# Patient Record
Sex: MALE | Race: WHITE | Employment: FULL TIME | URBAN - METROPOLITAN AREA
[De-identification: names, ages, dates, MRNs, and addresses within clinical notes are randomized per-mention and may not be internally consistent; named-entity substitution may affect disease eponyms.]

---

## 2023-01-22 ENCOUNTER — OFFICE VISIT (OUTPATIENT)
Dept: URGENT CARE | Facility: CLINIC | Age: 33
End: 2023-01-22
Payer: COMMERCIAL

## 2023-01-22 VITALS
DIASTOLIC BLOOD PRESSURE: 76 MMHG | HEIGHT: 74 IN | RESPIRATION RATE: 17 BRPM | WEIGHT: 284 LBS | OXYGEN SATURATION: 97 % | HEART RATE: 69 BPM | TEMPERATURE: 98 F | SYSTOLIC BLOOD PRESSURE: 134 MMHG | BODY MASS INDEX: 36.45 KG/M2

## 2023-01-22 DIAGNOSIS — S93.401A INVERSION SPRAIN OF RIGHT ANKLE, INITIAL ENCOUNTER: ICD-10-CM

## 2023-01-22 DIAGNOSIS — M25.471 PAIN AND SWELLING OF RIGHT ANKLE: Primary | ICD-10-CM

## 2023-01-22 DIAGNOSIS — M25.571 PAIN AND SWELLING OF RIGHT ANKLE: Primary | ICD-10-CM

## 2023-01-22 PROCEDURE — 3078F PR MOST RECENT DIASTOLIC BLOOD PRESSURE < 80 MM HG: ICD-10-PCS | Mod: CPTII,S$GLB,, | Performed by: PHYSICIAN ASSISTANT

## 2023-01-22 PROCEDURE — 3078F DIAST BP <80 MM HG: CPT | Mod: CPTII,S$GLB,, | Performed by: PHYSICIAN ASSISTANT

## 2023-01-22 PROCEDURE — 3075F PR MOST RECENT SYSTOLIC BLOOD PRESS GE 130-139MM HG: ICD-10-PCS | Mod: CPTII,S$GLB,, | Performed by: PHYSICIAN ASSISTANT

## 2023-01-22 PROCEDURE — 1159F PR MEDICATION LIST DOCUMENTED IN MEDICAL RECORD: ICD-10-PCS | Mod: CPTII,S$GLB,, | Performed by: PHYSICIAN ASSISTANT

## 2023-01-22 PROCEDURE — 3075F SYST BP GE 130 - 139MM HG: CPT | Mod: CPTII,S$GLB,, | Performed by: PHYSICIAN ASSISTANT

## 2023-01-22 PROCEDURE — 3008F BODY MASS INDEX DOCD: CPT | Mod: CPTII,S$GLB,, | Performed by: PHYSICIAN ASSISTANT

## 2023-01-22 PROCEDURE — 99204 OFFICE O/P NEW MOD 45 MIN: CPT | Mod: S$GLB,,, | Performed by: PHYSICIAN ASSISTANT

## 2023-01-22 PROCEDURE — 1159F MED LIST DOCD IN RCRD: CPT | Mod: CPTII,S$GLB,, | Performed by: PHYSICIAN ASSISTANT

## 2023-01-22 PROCEDURE — 3008F PR BODY MASS INDEX (BMI) DOCUMENTED: ICD-10-PCS | Mod: CPTII,S$GLB,, | Performed by: PHYSICIAN ASSISTANT

## 2023-01-22 PROCEDURE — 99204 PR OFFICE/OUTPT VISIT, NEW, LEVL IV, 45-59 MIN: ICD-10-PCS | Mod: S$GLB,,, | Performed by: PHYSICIAN ASSISTANT

## 2023-01-22 RX ORDER — IBUPROFEN 800 MG/1
800 TABLET ORAL DAILY PRN
COMMUNITY
Start: 2022-09-16 | End: 2023-01-22 | Stop reason: SDUPTHER

## 2023-01-22 RX ORDER — IBUPROFEN 800 MG/1
800 TABLET ORAL DAILY PRN
Qty: 10 TABLET | Refills: 0 | Status: SHIPPED | OUTPATIENT
Start: 2023-01-22 | End: 2023-02-01

## 2023-01-22 NOTE — PATIENT INSTRUCTIONS
Please obtain outpatient x-ray as we discussed     R.I.C.E:    Rest, apply ice intermittently, compress with ace wrap, and elevate above heart level as much as possible.  Do not apply ice directly to skin. Wrap ice in cloth before applying.    Please make sure that you wearing appropriate supportive shoes for lower extremity issues.    OTC Tylenol (acetaminophen) up to 4,000 mg a day is safe for short periods and can be used for pain, and fever. However in high doses and prolonged use it can cause liver irritation.    OTC Ibuprofen (NSAID) is a non-steroidal anti-inflammatory that can be used for pain. However it can also cause stomach irritation if over used.    Of note: Aleve, Motrin, Advil, Mobic, meloxicam, and indomethacin are also other names of NSAIDs.    OTC Voltaren topical cream may be applied for relief, as long as you do not have any allergy to the ingredients.    You will need to follow-up with orthopedics if your symptoms persist, as we discussed.      - You must understand that you have received an Urgent Care treatment only and that you may be released before all of your medical problems are known or treated.   - You, the patient, will arrange for follow up care as instructed with your primary care provider or recommended specialist.   - If your condition worsens or fails to improve we recommend that you receive another evaluation at the ER immediately or contact your PCP to discuss your concerns, or return here.   - Please do not drive or make any important decisions for 24 hours if you have received any pain medications, sedatives or mood altering drugs during your visit.    Disclaimer: This document was drafted with the use of a voice recognition device and is likely to have sound alike errors.

## 2023-01-22 NOTE — PROGRESS NOTES
"Subjective:       Patient ID: Regis Tyson is a 32 y.o. male.    Vitals:  height is 6' 2" (1.88 m) and weight is 128.8 kg (284 lb). His tympanic temperature is 97.9 °F (36.6 °C). His blood pressure is 134/76 and his pulse is 69. His respiration is 17 and oxygen saturation is 97%.     Chief Complaint: Ankle Pain    Patient presents with pain in the right ankle after [place] injury [place] [employer] [place] yesterday afternoon.  ibuprofen and gabapentin. Pain is greatest in the lateral malleolus and posterior ankle.    Foot Injury   The incident occurred 12 to 24 hours ago. The incident occurred at the park. The injury mechanism was an inversion injury. The pain is present in the right foot. The pain is at a severity of 6/10. The pain has been Constant since onset. Associated symptoms include a loss of motion. Pertinent negatives include no inability to bear weight, loss of sensation, muscle weakness, numbness or tingling. He reports no foreign bodies present. The symptoms are aggravated by palpation and weight bearing. He has tried NSAIDs, ice, rest and elevation for the symptoms.     Musculoskeletal:  Positive for pain, trauma, joint pain and joint swelling. Negative for abnormal ROM of joint and pain with walking.   Skin:  Negative for abrasion, laceration and bruising.   Neurological:  Negative for numbness.     Objective:      Vitals:    01/22/23 1014   BP: 134/76   Pulse: 69   Resp: 17   Temp: 97.9 °F (36.6 °C)       Physical Exam   Constitutional: He is oriented to person, place, and time.  Non-toxic appearance. He does not appear ill. No distress.   HENT:   Head: Normocephalic and atraumatic.   Ears:   Right Ear: External ear normal.   Left Ear: External ear normal.   Nose: Nose normal.   Eyes: Conjunctivae are normal. No scleral icterus.   Neck: Neck supple.   Cardiovascular: Normal rate and normal pulses.   Pulmonary/Chest: Effort normal.   Musculoskeletal:      Right ankle: He exhibits swelling. He " exhibits normal range of motion and normal pulse. Tenderness. Achilles tendon normal. Achilles tendon exhibits no defect and normal Valdez's test results.      Left ankle: He exhibits normal range of motion and no swelling. No tenderness. Achilles tendon normal. Achilles tendon exhibits no defect and normal Valdez's test results.      Right foot: Normal. No swelling.      Left foot: Normal. No swelling.   Neurological: no focal deficit. He is alert, oriented to person, place, and time and at baseline. He has normal motor skills and normal sensation. He displays no weakness. Gait and coordination normal. GCS eye subscore is 4. GCS verbal subscore is 5. GCS motor subscore is 6.   Skin: Skin is warm, dry and not diaphoretic. Capillary refill takes less than 2 seconds.   Psychiatric: His behavior is normal. Mood, judgment and thought content normal.   Nursing note and vitals reviewed.      Assessment:       1. Pain and swelling of right ankle    2. Inversion sprain of right ankle, initial encounter          Plan:         Pain and swelling of right ankle  -     XR ANKLE COMPLETE 3 VIEW RIGHT; Future; Expected date: 01/22/2023  -     BANDAGE ELASTIC 3IN ACE    Inversion sprain of right ankle, initial encounter           Medical Decision Making:   Clinical Tests:   Radiological Study: Ordered  Urgent Care Management:  Ace wrap and ice pack given in clinic today    X-ray ordered and pending as we do not have x-ray capabilities at [facility] today; patient reports that he will go to [facility] tomorrow for xray. We will call with results           Patient Instructions   Please obtain outpatient x-ray as we discussed     R.I.C.E:    Rest, apply ice intermittently, compress with ace wrap, and elevate above heart level as much as possible.  Do not apply ice directly to skin. Wrap ice in cloth before applying.    Please make sure that you wearing appropriate supportive shoes for lower extremity  issues.    OTC Tylenol (acetaminophen) up to 4,000 mg a day is safe for short periods and can be used for pain, and fever. However in high doses and prolonged use it can cause liver irritation.    OTC Ibuprofen (NSAID) is a non-steroidal anti-inflammatory that can be used for pain. However it can also cause stomach irritation if over used.    Of note: Aleve, Motrin, Advil, Mobic, meloxicam, and indomethacin are also other names of NSAIDs.    OTC Voltaren topical cream may be applied for relief, as long as you do not have any allergy to the ingredients.    You will need to follow-up with orthopedics if your symptoms persist, as we discussed.      - You must understand that you have received an Urgent Care treatment only and that you may be released before all of your medical problems are known or treated.   - You, the patient, will arrange for follow up care as instructed with your primary care provider or recommended specialist.   - If your condition worsens or fails to improve we recommend that you receive another evaluation at the ER immediately or contact your PCP to discuss your concerns, or return here.   - Please do not drive or make any important decisions for 24 hours if you have received any pain medications, sedatives or mood altering drugs during your visit.    Disclaimer: This document was drafted with the use of a voice recognition device and is likely to have sound alike errors.

## 2023-01-23 ENCOUNTER — HOSPITAL ENCOUNTER (OUTPATIENT)
Dept: RADIOLOGY | Facility: HOSPITAL | Age: 33
Discharge: HOME | End: 2023-01-23
Attending: PHYSICIAN ASSISTANT
Payer: COMMERCIAL

## 2023-01-23 DIAGNOSIS — M25.471 PAIN AND SWELLING OF RIGHT ANKLE: ICD-10-CM

## 2023-01-23 DIAGNOSIS — M25.571 PAIN AND SWELLING OF RIGHT ANKLE: ICD-10-CM

## 2023-01-23 PROCEDURE — 73610 X-RAY EXAM OF ANKLE: CPT | Mod: TC,FY,PO,RT

## 2023-01-23 PROCEDURE — 73610 XR ANKLE COMPLETE 3 VIEW RIGHT: ICD-10-PCS | Mod: 26,RT,, | Performed by: RADIOLOGY

## 2023-01-23 PROCEDURE — 73610 X-RAY EXAM OF ANKLE: CPT | Mod: 26,RT,, | Performed by: RADIOLOGY

## 2023-01-24 ENCOUNTER — TELEPHONE (OUTPATIENT)
Dept: URGENT CARE | Facility: CLINIC | Age: 33
End: 2023-01-24
Payer: COMMERCIAL

## 2023-01-24 NOTE — TELEPHONE ENCOUNTER
R.I.C.E:    Rest, apply ice intermittently, compress with ace wrap, and elevate above heart level as much as possible.  Do not apply ice directly to skin. Wrap ice in cloth before applying.    Please make sure that you wearing appropriate supportive shoes for lower extremity issues.    OTC Tylenol (acetaminophen) up to 4,000 mg a day is safe for short periods and can be used for pain, and fever. However in high doses and prolonged use it can cause liver irritation.    OTC Ibuprofen (NSAID) is a non-steroidal anti-inflammatory that can be used for pain. However it can also cause stomach irritation if over used.    Of note: Aleve, Motrin, Advil, Mobic, meloxicam, and indomethacin are also other names of NSAIDs.    OTC Voltaren topical cream may be applied for relief, as long as you do not have any allergy to the ingredients.    You will need to follow-up with orthopedics if your symptoms persist, as we discussed.      XR ANKLE COMPLETE 3 VIEW RIGHT    Result Date: 1/23/2023  EXAMINATION: XR ANKLE COMPLETE 3 VIEW RIGHT CLINICAL HISTORY: pain/swelling/inversion injury; Pain in right ankle and joints of right foot TECHNIQUE: AP, lateral, and oblique images of the right ankle were performed. COMPARISON: None FINDINGS: There is soft tissue swelling about the lateral malleolus.  No acute displaced fracture or dislocation.  Enthesophytes arise from the neck of the talus.  The tibiotalar joint is well preserved.  No radiopaque foreign body.     1. Soft tissue swelling about the lateral malleolus.  No acute displaced fracture or dislocation.  Cortical irregularity along the talus secondary to degenerative enthesophytes.     Electronically signed by: Bairon Ham MD Date:    01/23/2023 Time:    08:42

## 2025-01-31 ENCOUNTER — OFFICE VISIT (OUTPATIENT)
Dept: URGENT CARE | Facility: CLINIC | Age: 35
End: 2025-01-31
Payer: COMMERCIAL

## 2025-01-31 VITALS
TEMPERATURE: 98 F | WEIGHT: 295 LBS | BODY MASS INDEX: 37.86 KG/M2 | HEIGHT: 74 IN | RESPIRATION RATE: 20 BRPM | DIASTOLIC BLOOD PRESSURE: 81 MMHG | SYSTOLIC BLOOD PRESSURE: 137 MMHG | HEART RATE: 78 BPM | OXYGEN SATURATION: 96 %

## 2025-01-31 DIAGNOSIS — H18.821 CORNEAL ABRASION DUE TO CONTACT LENS, RIGHT: ICD-10-CM

## 2025-01-31 DIAGNOSIS — H57.89 REDNESS OF EYE, RIGHT: ICD-10-CM

## 2025-01-31 DIAGNOSIS — T15.91XA FOREIGN BODY OF RIGHT EYE, INITIAL ENCOUNTER: Primary | ICD-10-CM

## 2025-01-31 DIAGNOSIS — Z97.3 WEARS CONTACT LENSES: ICD-10-CM

## 2025-01-31 PROCEDURE — 99214 OFFICE O/P EST MOD 30 MIN: CPT | Mod: S$GLB,,, | Performed by: PHYSICIAN ASSISTANT

## 2025-01-31 RX ORDER — ERYTHROMYCIN 5 MG/G
OINTMENT OPHTHALMIC 3 TIMES DAILY
Qty: 3.5 G | Refills: 0 | Status: SHIPPED | OUTPATIENT
Start: 2025-01-31 | End: 2025-02-07

## 2025-01-31 RX ORDER — PROPARACAINE HYDROCHLORIDE 5 MG/ML
1 SOLUTION/ DROPS OPHTHALMIC ONCE
Status: COMPLETED | OUTPATIENT
Start: 2025-01-31 | End: 2025-01-31

## 2025-01-31 RX ORDER — FLUORESCEIN SODIUM AND BENOXINATE HYDROCHLORIDE 2.6; 4.4 MG/ML; MG/ML
1 SOLUTION/ DROPS OPHTHALMIC ONCE
Status: COMPLETED | OUTPATIENT
Start: 2025-01-31 | End: 2025-01-31

## 2025-01-31 RX ADMIN — PROPARACAINE HYDROCHLORIDE 1 DROP: 5 SOLUTION/ DROPS OPHTHALMIC at 10:01

## 2025-01-31 RX ADMIN — FLUORESCEIN SODIUM AND BENOXINATE HYDROCHLORIDE 1 DROP: 2.6; 4.4 SOLUTION/ DROPS OPHTHALMIC at 10:01

## 2025-01-31 NOTE — LETTER
January 31, 2025      [facility]  [address], [address]  [place] [address]  Phone: [phone]       Patient: Regis Tyson   YOB: 1990  Date of Visit: 01/31/2025    To Whom It May Concern:    Bebo Tyson  was at [facility] on 01/31/2025. The patient may return to work/school on 2/1 with no restrictions. If you have any questions or concerns, or if I can be of further assistance, please do not hesitate to contact me.    Sincerely,        Lizbeth Bruno PA-C

## 2025-01-31 NOTE — PROGRESS NOTES
"Subjective:      Patient ID: Regis Tyson is a 34 y.o. male.    Vitals:  height is 6' 2" (1.88 m) and weight is 133.8 kg (295 lb). His oral temperature is 98 °F (36.7 °C). His blood pressure is 137/81 and his pulse is 78. His respiration is 20 and oxygen saturation is 96%.     Chief Complaint: Conjunctivitis    34 year old c/o R eye redness since yesterday. Pt tried OTC eyes drops that helped a little.  Reports associated photophobia.  Believes symptoms could be related to dry eye contacts. 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     Conjunctivitis  This is a new problem. The current episode started yesterday. The problem occurs constantly. The problem has been unchanged. Pertinent negatives include no abdominal pain, anorexia, arthralgias, change in bowel habit, chest pain, chills, congestion, coughing, diaphoresis, fatigue, fever, headaches, joint swelling, myalgias, nausea, neck pain, numbness, rash, sore throat, swollen glands, urinary symptoms, vertigo, visual change, vomiting or weakness. Nothing aggravates the symptoms.       Constitution: Negative for chills, sweating, fatigue and fever.   HENT:  Negative for congestion and sore throat.    Neck: Negative for neck pain.   Cardiovascular:  Negative for chest pain.   Eyes:  Positive for eye redness and photophobia. Negative for eye trauma.   Respiratory:  Negative for cough.    Gastrointestinal:  Negative for abdominal pain, nausea and vomiting.   Musculoskeletal:  Negative for joint pain, joint swelling and muscle ache.   Skin:  Negative for rash.   Neurological:  Negative for history of vertigo, headaches and numbness.      Objective:     Vitals:    01/31/25 1013   BP: 137/81   Pulse: 78   Resp: 20   Temp: 98 °F (36.7 °C)   TempSrc: Oral   SpO2: 96%   Weight: 133.8 kg (295 lb)   Height: 6' 2" (1.88 m)       Physical Exam   Constitutional: He is oriented to person, place, and time. He appears well-developed. "   HENT:   Head: Normocephalic and atraumatic.   Ears:   Right Ear: External ear normal.   Left Ear: External ear normal.   Nose: Nose normal.   Mouth/Throat: Oropharynx is clear and moist.   Eyes: EOM and lids are normal. Pupils are equal, round, and reactive to light. No visual field deficit is present. Right eye exhibits discharge. Right eye exhibits no chemosis and no exudate. Foreign body present in the right eye. Right conjunctiva is injected. Right conjunctiva has no hemorrhage. No scleral icterus. Pupils are equal.   Slit lamp exam:       The right eye shows corneal abrasion and fluorescein uptake. Extraocular movement intact vision grossly intact gaze aligned appropriately periorbital hyperpigmentation     Comments: SMALL ONEIDA IRRIGATED FROM EYE   Neck: Trachea normal and phonation normal. Neck supple.   Musculoskeletal: Normal range of motion.         General: Normal range of motion.   Neurological: He is alert and oriented to person, place, and time.   Skin: Skin is warm, dry and intact.   Psychiatric: His speech is normal and behavior is normal. Judgment and thought content normal.   Nursing note and vitals reviewed.      Assessment:     1. Foreign body of right eye, initial encounter    2. Redness of eye, right    3. Wears contact lenses    4. Corneal abrasion due to contact lens, right      Vision Screening    Right eye Left eye Both eyes   Without correction 20/30 20/20 20/15   With correction          Plan:       Foreign body of right eye, initial encounter  -     proparacaine 0.5 % ophthalmic solution 1 drop  -     fluorescein-benoxinate 0.3-0.4% ophthalmic solution 1 drop  -     erythromycin (ROMYCIN) ophthalmic ointment; Place into the right eye 3 (three) times daily. PLEASE APPLY TO THE AFFECTED EYE(S).  PLEASE REFER TO OINTMENT APPLICATION INSTRUCTIONS FOUND IN YOUR AFTER VISIT SUMMARY. for 7 days  Dispense: 3.5 g; Refill: 0    Redness of eye, right  -     Visual acuity screening    Wears  contact lenses    Corneal abrasion due to contact lens, right          Medical Decision Making:   Initial Assessment:   VSS  Urgent Care Management:  See entire note for further details  Discussed with pt all pertinent UC information and results. Discussed pt dx and plan of tx.   Gave pt all f/u and return to the UC instructions. All questions and concerns were addressed at this time.   Pt expresses understanding of information and instructions, and is comfortable with plan to discharge.   Pt is stable for discharge.     I discussed with patient and/or family/caretaker that evaluation in the UC does not suggest any emergent or life threatening medical conditions requiring immediate intervention beyond what was provided in the UC, and I believe patient is safe for discharge.  Regardless, an unremarkable evaluation in the UC does not preclude the development or presence of a serious or life threatening condition. As such, patient was instructed to GO to ER immediately for any worsening or change in current symptoms.             Patient Instructions   EYE FOREIGN BODY:    1. you may get over-the-counter allergy eyedrops for eye redness  2. erythromycin OINTMENT prescribed to cover infection due to foreign body removal/corneal abrasion  3.ophthalmology  follow-up, Please call clinic  for status of appointment. ([phone])    4. take over-the-counter Tylenol or ibuprofen as needed for pain  5. please wear protective lenses when performing jobs that may cause danger to eyes  6. wear sunglasses in the sunlight  7. go to ER for any emergency or worsening pain/symptoms      If you have been discharged from the clinic prior to your point of care test results being completed, please make sure to check your [facility] account.  If there is a change in treatment, we will communicate with you through here.  If your test is positive, and medications are ordered, these will be sent to your preferred pharmacy.   If your  test is negative, no further steps needed. If you do not hear from us or have questions, please call the clinic.      - You must understand that you have received an Urgent Care treatment only and that you may be released before all of your medical problems are known or treated.   - You, the patient, will arrange for follow up care as instructed with your primary care provider or recommended specialist.   - If your condition worsens or fails to improve we recommend that you receive another evaluation at the ER immediately or contact your PCP to discuss your concerns, or return here.   - Please do not drive or make any important decisions for 24 hours if you have received any pain medications, sedatives or mood altering drugs during your visit.    Disclaimer: This document was drafted with the use of a voice recognition device and is likely to have sound alike errors.

## 2025-01-31 NOTE — PATIENT INSTRUCTIONS
EYE FOREIGN BODY:    1. you may get over-the-counter allergy eyedrops for eye redness  2. erythromycin OINTMENT prescribed to cover infection due to foreign body removal/corneal abrasion  3.ophthalmology  follow-up, Please call clinic  for status of appointment. ([phone])    4. take over-the-counter Tylenol or ibuprofen as needed for pain  5. please wear protective lenses when performing jobs that may cause danger to eyes  6. wear sunglasses in the sunlight  7. go to ER for any emergency or worsening pain/symptoms  -WEAR GLASSES NOT CONTACTS FOR 7-10 DAYS      If you have been discharged from the clinic prior to your point of care test results being completed, please make sure to check your [employer] account.  If there is a change in treatment, we will communicate with you through here.  If your test is positive, and medications are ordered, these will be sent to your preferred pharmacy.   If your test is negative, no further steps needed. If you do not hear from us or have questions, please call the clinic.      - You must understand that you have received an Urgent Care treatment only and that you may be released before all of your medical problems are known or treated.   - You, the patient, will arrange for follow up care as instructed with your primary care provider or recommended specialist.   - If your condition worsens or fails to improve we recommend that you receive another evaluation at the ER immediately or contact your PCP to discuss your concerns, or return here.   - Please do not drive or make any important decisions for 24 hours if you have received any pain medications, sedatives or mood altering drugs during your visit.    Disclaimer: This document was drafted with the use of a voice recognition device and is likely to have sound alike errors.

## 2025-02-01 ENCOUNTER — TELEPHONE (OUTPATIENT)
Dept: URGENT CARE | Facility: CLINIC | Age: 35
End: 2025-02-01
Payer: COMMERCIAL

## 2025-02-01 NOTE — TELEPHONE ENCOUNTER
Courtesy call. Pt stated that visit was fantastic and staff was great. Pt did not have any questions.